# Patient Record
Sex: FEMALE | Race: WHITE | Employment: UNEMPLOYED | URBAN - METROPOLITAN AREA
[De-identification: names, ages, dates, MRNs, and addresses within clinical notes are randomized per-mention and may not be internally consistent; named-entity substitution may affect disease eponyms.]

---

## 2022-01-11 ENCOUNTER — TRANSFERRED RECORDS (OUTPATIENT)
Dept: HEALTH INFORMATION MANAGEMENT | Facility: CLINIC | Age: 22
End: 2022-01-11
Payer: COMMERCIAL

## 2022-02-15 ENCOUNTER — HOSPITAL ENCOUNTER (OUTPATIENT)
Dept: CARDIOLOGY | Facility: CLINIC | Age: 22
Discharge: HOME | End: 2022-02-15
Attending: INTERNAL MEDICINE | Admitting: INTERNAL MEDICINE
Payer: COMMERCIAL

## 2022-02-15 ENCOUNTER — OFFICE VISIT (OUTPATIENT)
Dept: CARDIOLOGY | Facility: CLINIC | Age: 22
End: 2022-02-15
Payer: COMMERCIAL

## 2022-02-15 ENCOUNTER — LAB (OUTPATIENT)
Dept: LAB | Facility: CLINIC | Age: 22
End: 2022-02-15
Payer: COMMERCIAL

## 2022-02-15 VITALS
DIASTOLIC BLOOD PRESSURE: 105 MMHG | OXYGEN SATURATION: 97 % | BODY MASS INDEX: 42.3 KG/M2 | HEIGHT: 67 IN | HEART RATE: 120 BPM | SYSTOLIC BLOOD PRESSURE: 137 MMHG | WEIGHT: 269.5 LBS

## 2022-02-15 DIAGNOSIS — Z83.438 FAMILY HISTORY OF DYSLIPIDEMIA: ICD-10-CM

## 2022-02-15 DIAGNOSIS — Z82.49 FAMILY HISTORY OF ISCHEMIC HEART DISEASE: ICD-10-CM

## 2022-02-15 DIAGNOSIS — E78.5 DYSLIPIDEMIA: ICD-10-CM

## 2022-02-15 DIAGNOSIS — E66.01 MORBID OBESITY (H): ICD-10-CM

## 2022-02-15 DIAGNOSIS — I10 BENIGN ESSENTIAL HYPERTENSION: ICD-10-CM

## 2022-02-15 DIAGNOSIS — Z82.49 FAMILY HISTORY OF ISCHEMIC HEART DISEASE: Primary | ICD-10-CM

## 2022-02-15 DIAGNOSIS — F41.9 ANXIETY: ICD-10-CM

## 2022-02-15 DIAGNOSIS — J45.990 EXERCISE-INDUCED ASTHMA: ICD-10-CM

## 2022-02-15 LAB
ALBUMIN SERPL-MCNC: 4.2 G/DL (ref 3.4–5)
ALP SERPL-CCNC: 85 U/L (ref 40–150)
ALT SERPL W P-5'-P-CCNC: 104 U/L (ref 0–50)
ANION GAP SERPL CALCULATED.3IONS-SCNC: 9 MMOL/L (ref 3–14)
AST SERPL W P-5'-P-CCNC: 56 U/L (ref 0–45)
BILIRUB SERPL-MCNC: 0.5 MG/DL (ref 0.2–1.3)
BUN SERPL-MCNC: 9 MG/DL (ref 7–30)
CALCIUM SERPL-MCNC: 9.6 MG/DL (ref 8.5–10.1)
CHLORIDE BLD-SCNC: 104 MMOL/L (ref 94–109)
CHOLEST SERPL-MCNC: 265 MG/DL
CO2 SERPL-SCNC: 22 MMOL/L (ref 20–32)
CREAT SERPL-MCNC: 0.61 MG/DL (ref 0.52–1.04)
ERYTHROCYTE [DISTWIDTH] IN BLOOD BY AUTOMATED COUNT: 11.7 % (ref 10–15)
FASTING STATUS PATIENT QL REPORTED: YES
GFR SERPL CREATININE-BSD FRML MDRD: >90 ML/MIN/1.73M2
GLUCOSE BLD-MCNC: 118 MG/DL (ref 70–99)
HBA1C MFR BLD: 6.1 % (ref 0–5.6)
HCT VFR BLD AUTO: 45.9 % (ref 35–47)
HDLC SERPL-MCNC: 35 MG/DL
HGB BLD-MCNC: 15.4 G/DL (ref 11.7–15.7)
LDLC SERPL CALC-MCNC: 161 MG/DL
LDLC SERPL CALC-MCNC: 189 MG/DL
LVEF ECHO: NORMAL
MCH RBC QN AUTO: 29.2 PG (ref 26.5–33)
MCHC RBC AUTO-ENTMCNC: 33.6 G/DL (ref 31.5–36.5)
MCV RBC AUTO: 87 FL (ref 78–100)
NONHDLC SERPL-MCNC: 230 MG/DL
PLATELET # BLD AUTO: 362 10E3/UL (ref 150–450)
POTASSIUM BLD-SCNC: 4 MMOL/L (ref 3.4–5.3)
PROT SERPL-MCNC: 8.2 G/DL (ref 6.8–8.8)
RBC # BLD AUTO: 5.28 10E6/UL (ref 3.8–5.2)
SODIUM SERPL-SCNC: 135 MMOL/L (ref 133–144)
TRIGL SERPL-MCNC: 343 MG/DL
TSH SERPL DL<=0.005 MIU/L-ACNC: 2.61 MU/L (ref 0.4–4)
WBC # BLD AUTO: 9.5 10E3/UL (ref 4–11)

## 2022-02-15 PROCEDURE — 999N000208 ECHOCARDIOGRAM COMPLETE

## 2022-02-15 PROCEDURE — 84443 ASSAY THYROID STIM HORMONE: CPT | Performed by: INTERNAL MEDICINE

## 2022-02-15 PROCEDURE — 83721 ASSAY OF BLOOD LIPOPROTEIN: CPT | Performed by: INTERNAL MEDICINE

## 2022-02-15 PROCEDURE — 99205 OFFICE O/P NEW HI 60 MIN: CPT | Mod: 25 | Performed by: INTERNAL MEDICINE

## 2022-02-15 PROCEDURE — 85027 COMPLETE CBC AUTOMATED: CPT | Performed by: INTERNAL MEDICINE

## 2022-02-15 PROCEDURE — 83695 ASSAY OF LIPOPROTEIN(A): CPT | Mod: 90 | Performed by: INTERNAL MEDICINE

## 2022-02-15 PROCEDURE — 93000 ELECTROCARDIOGRAM COMPLETE: CPT | Performed by: INTERNAL MEDICINE

## 2022-02-15 PROCEDURE — 36415 COLL VENOUS BLD VENIPUNCTURE: CPT | Performed by: INTERNAL MEDICINE

## 2022-02-15 PROCEDURE — 93306 TTE W/DOPPLER COMPLETE: CPT | Mod: 26 | Performed by: INTERNAL MEDICINE

## 2022-02-15 PROCEDURE — 80053 COMPREHEN METABOLIC PANEL: CPT | Performed by: INTERNAL MEDICINE

## 2022-02-15 PROCEDURE — 80061 LIPID PANEL: CPT | Performed by: INTERNAL MEDICINE

## 2022-02-15 PROCEDURE — 255N000002 HC RX 255 OP 636: Performed by: INTERNAL MEDICINE

## 2022-02-15 PROCEDURE — 99000 SPECIMEN HANDLING OFFICE-LAB: CPT | Performed by: INTERNAL MEDICINE

## 2022-02-15 PROCEDURE — 83036 HEMOGLOBIN GLYCOSYLATED A1C: CPT | Performed by: INTERNAL MEDICINE

## 2022-02-15 RX ORDER — NORGESTIMATE AND ETHINYL ESTRADIOL 0.25-0.035
1 KIT ORAL DAILY
COMMUNITY

## 2022-02-15 RX ORDER — ALBUTEROL SULFATE 90 UG/1
2 AEROSOL, METERED RESPIRATORY (INHALATION) EVERY 6 HOURS PRN
COMMUNITY

## 2022-02-15 RX ADMIN — HUMAN ALBUMIN MICROSPHERES AND PERFLUTREN 9 ML: 10; .22 INJECTION, SOLUTION INTRAVENOUS at 14:34

## 2022-02-15 ASSESSMENT — MIFFLIN-ST. JEOR: SCORE: 2010.07

## 2022-02-15 NOTE — LETTER
2/15/2022    Provider Not In System       RE: Laureen Kohler       Dear Colleague,     I had the pleasure of seeing Laureen Kohler in the [facility].      Cardiology Clinic Consultation:    Preventive Cardiology Visit    February 15, 2022   Patient Name: Laureen Kohler  Patient MRN: [id number]    Consult indication: FH of ASCVD      HPI:    I had the opportunity to see patient Laureen Kohler in cardiology clinic for a consultation. Patient is followed by our colleague with OB/gyn.    As you know, patient is a pleasant 21-year-old female with a past medical history significant for obesity, exercise-induced asthma, family history of early ASCVD, dyslipidemia in childhood, who presents for cardiovascular disease risk assessment and modification.    Patient reports that when she was approximately 8 years old, she was found to have elevated cholesterol levels.  Her father passed away from an MI at age 50, and her uncle is currently hospitalized on the [facility] with heart related issues, and patient notes that he had an abnormal Lp(a) level.     Patient recently moved from the [facility] to the [place].  She works as a dietitian.  At baseline, she is not as physically active as she would like, previously exercised regularly, though had issues with anxiety and notes that her relationship with gym/diet was unhealthy, though she has since moved past this.  She denies any chest pain, chest pressure, abnormal shortness of breath.  No recent change in exertional capacity.  She uses an inhaler as needed, also is on oral contraceptives for birth control.    Blood pressure is elevated today at 137/105 mmHg, heart rate 110 bpm in normal sinus.  She does endorse that she is likely somewhat anxious.  She reports that her blood pressures are slightly elevated typically, though generally in the 120/70-80 mmHg range at home.  Heart rates are generally in the 70s beats per minute range at home.      She  "does not drink alcohol regularly, does not smoke cigarettes, denies recreational drug use.    She does snore, however denies excessive daytime somnolence, nonrestorative sleep.  She had a tonsillectomy in early childhood.    ECG demonstrates sinus tachycardia at approximately 117 bpm, normal axis, normal intervals, no acute ischemic changes.    Assessment and Plan/Recommendations:    # FH of early ASCVD  # Dyslipidemia in childhood, prior records not available for review  # HTN, BP elevated in clinic though lower at home, consistent with \"White coat hypertension\"  # Anxiety  # Sinus tachycardia, likely 2/2 anxiety, also was running late for the appointment after some difficulty finding our clinic location (arrived an hour early)  # Exercise-induced asthma  # Obesity    -Fasting lipids, Lp(a) level  -Patient is planning to become pregnant in the future, discussed that generally would want to avoid initiation of nonessential medications as able, and so would favor lifestyle modification of dyslipidemia and hypertension as first-line management, patient is in agreement.  She is on oral contraceptives which can result in dyslipidemia, depending on labs, may recommend she revisit alternative options with her OB/Gyn physician  -Hemoglobin A1c, CBC, CMP  -Holter monitor  -24-hour ambulatory BP monitor  -TTE  -Advised to continue regular exercise, goal for weight loss, and to continue a heart healthy diet (Mediterranean diet)   -Follow-up in 6 months with fasting lipids at that time, or sooner as needed    Thank you for allowing our team to participate in the care of Laureen Kohler.  Please do not hesitate to call or page me with any questions or concerns.    Sincerely,     Prem Rodríguez MD, [facility]  Cardiology  February 15, 2022    Voice recognition software utilized.     Total time spent on this encounter: 65 minutes, providing care in this encounter including, but not limited to, reviewing prior medical " "records, laboratory data, imaging studies, diagnostic studies, procedure notes, formulating an assessment and plan, recommendations, discussion and counseling with patient face to face, dictation.    Past Medical History:     Patient Active Problem List   Diagnosis     Morbid obesity (H)     Dyslipidemia     Benign essential hypertension     Family history of dyslipidemia     Family history of ischemic heart disease     Exercise-induced asthma       Past Surgical History:   History reviewed. No pertinent surgical history.    Medications (outpatient):  Current Outpatient Medications   Medication Sig Dispense Refill     albuterol (PROAIR HFA/PROVENTIL HFA/VENTOLIN HFA) 108 (90 Base) MCG/ACT inhaler Inhale 2 puffs into the lungs every 6 hours as needed for shortness of breath / dyspnea or wheezing       Aspirin 81 MG CAPS Take by mouth daily       norgestimate-ethinyl estradiol (ORTHO-CYCLEN) 0.25-35 MG-MCG tablet Take 1 tablet by mouth daily         Allergies:  Allergies   Allergen Reactions     Sulfa Drugs      rash       Social History:   History   Drug Use Not on file      History   Smoking Status     Never Smoker   Smokeless Tobacco     Never Used     Social History    Substance and Sexual Activity      Alcohol use: Yes        Comment: 2 drinks month       Family History:  Family History   Problem Relation Age of Onset     Myocardial Infarction Father      Myocardial Infarction Maternal Grandfather      Myocardial Infarction Paternal Grandmother      Myocardial Infarction Paternal Grandfather      Myocardial Infarction Paternal Uncle        Review of Systems:   A complete review of systems was negative except as mentioned in the History of Present Illness.     Objective & Physical Exam:  BP (!) 137/105   Pulse 120   Ht 1.702 m (5' 7\")   Wt 122.2 kg (269 lb 8 oz)   SpO2 97%   BMI 42.21 kg/m    Wt Readings from Last 2 Encounters:   02/15/22 122.2 kg (269 lb 8 oz)     Body mass index is 42.21 kg/m .   Body " surface area is 2.4 meters squared.    Constitutional: appears stated age, in no apparent distress, appears to be well nourished  Head: normocephalic, atraumatic  Neck: supple, trachea midline, no bruit bilaterally   Pulmonary: clear to auscultation bilaterally, no wheezes, no rales, no increased work of breathing  Cardiovascular: JVP normal, tachycardic, regular rhythm, normal S1 and S2, no S3, S4, no murmur appreciated, no lower extremity edema  Gastrointestinal: no guarding, non-rigid   Neurologic: awake, alert, moves all extremities  Skin: no jaundice, warm on limited exam  Psychiatric: affect is normal, answers questions appropriately, oriented to self and place          Thank you for allowing me to participate in the care of your patient.      Sincerely,     Prem Rodríguez MD     [facility]  cc:   No referring provider defined for this encounter.

## 2022-02-15 NOTE — LETTER
Date:March 10, 2022      Patient was self referred, no letter generated. Do not send.        [facility] Health Information

## 2022-02-15 NOTE — PATIENT INSTRUCTIONS
February 15, 2022    Thank you for allowing our Cardiology team to participate in your care.     Please note the following changes to your heart treatment plan:     Medication changes:   - none    Tests to be done:  - transthoracic echocardiogram (heart ultrasound)  - 24h BP monitor  - Holter monitor   - FASTING labs today  - FASTING labs in 6 months    Follow up:  - Follow up in 6 months, or sooner as needed.      Please contact our team at [phone] or [phone] for any questions or concerns.   For scheduling, please call [phone].  If you are having a medical emergency, please call [phone].     Sincerely,    Prem Rodríguez MD, FACC  Cardiology    [facility] [place]

## 2022-02-15 NOTE — PROGRESS NOTES
Cardiology Clinic Consultation:    Preventive Cardiology Visit    February 15, 2022   Patient Name: Laureen Kohler  Patient MRN: [id number]    Consult indication: FH of ASCVD      HPI:    I had the opportunity to see patient Laureen Kohler in cardiology clinic for a consultation. Patient is followed by our colleague with OB/gyn.    As you know, patient is a pleasant 21-year-old female with a past medical history significant for obesity, exercise-induced asthma, family history of early ASCVD, dyslipidemia in childhood, who presents for cardiovascular disease risk assessment and modification.    Patient reports that when she was approximately 8 years old, she was found to have elevated cholesterol levels.  Her father passed away from an MI at age 50, and her uncle is currently hospitalized on the [facility] with heart related issues, and patient notes that he had an abnormal Lp(a) level.     Patient recently moved from the [facility] to the [place].  She works as a dietitian.  At baseline, she is not as physically active as she would like, previously exercised regularly, though had issues with anxiety and notes that her relationship with gym/diet was unhealthy, though she has since moved past this.  She denies any chest pain, chest pressure, abnormal shortness of breath.  No recent change in exertional capacity.  She uses an inhaler as needed, also is on oral contraceptives for birth control.    Blood pressure is elevated today at 137/105 mmHg, heart rate 110 bpm in normal sinus.  She does endorse that she is likely somewhat anxious.  She reports that her blood pressures are slightly elevated typically, though generally in the 120/70-80 mmHg range at home.  Heart rates are generally in the 70s beats per minute range at home.      She does not drink alcohol regularly, does not smoke cigarettes, denies recreational drug use.    She does snore, however denies excessive daytime somnolence, nonrestorative  "sleep.  She had a tonsillectomy in early childhood.    ECG demonstrates sinus tachycardia at approximately 117 bpm, normal axis, normal intervals, no acute ischemic changes.    Assessment and Plan/Recommendations:    # FH of early ASCVD  # Dyslipidemia in childhood, prior records not available for review  # HTN, BP elevated in clinic though lower at home, consistent with \"White coat hypertension\"  # Anxiety  # Sinus tachycardia, likely 2/2 anxiety, also was running late for the appointment after some difficulty finding our clinic location (arrived an hour early)  # Exercise-induced asthma  # Obesity    -Fasting lipids, Lp(a) level  -Patient is planning to become pregnant in the future, discussed that generally would want to avoid initiation of nonessential medications as able, and so would favor lifestyle modification of dyslipidemia and hypertension as first-line management, patient is in agreement.  She is on oral contraceptives which can result in dyslipidemia, depending on labs, may recommend she revisit alternative options with her OB/Gyn physician  -Hemoglobin A1c, CBC, CMP  -Holter monitor  -24-hour ambulatory BP monitor  -TTE  -Advised to continue regular exercise, goal for weight loss, and to continue a heart healthy diet (Mediterranean diet)   -Follow-up in 6 months with fasting lipids at that time, or sooner as needed    Thank you for allowing our team to participate in the care of Laureen Kohler.  Please do not hesitate to call or page me with any questions or concerns.    Sincerely,     Prem Rodríguez MD, [facility]  Cardiology  February 15, 2022    Voice recognition software utilized.     Total time spent on this encounter: 65 minutes, providing care in this encounter including, but not limited to, reviewing prior medical records, laboratory data, imaging studies, diagnostic studies, procedure notes, formulating an assessment and plan, recommendations, discussion and counseling with patient " "face to face, dictation.    Past Medical History:     Patient Active Problem List   Diagnosis     Morbid obesity (H)     Dyslipidemia     Benign essential hypertension     Family history of dyslipidemia     Family history of ischemic heart disease     Exercise-induced asthma       Past Surgical History:   History reviewed. No pertinent surgical history.    Medications (outpatient):  Current Outpatient Medications   Medication Sig Dispense Refill     albuterol (PROAIR HFA/PROVENTIL HFA/VENTOLIN HFA) 108 (90 Base) MCG/ACT inhaler Inhale 2 puffs into the lungs every 6 hours as needed for shortness of breath / dyspnea or wheezing       Aspirin 81 MG CAPS Take by mouth daily       norgestimate-ethinyl estradiol (ORTHO-CYCLEN) 0.25-35 MG-MCG tablet Take 1 tablet by mouth daily         Allergies:  Allergies   Allergen Reactions     Sulfa Drugs      rash       Social History:   History   Drug Use Not on file      History   Smoking Status     Never Smoker   Smokeless Tobacco     Never Used     Social History    Substance and Sexual Activity      Alcohol use: Yes        Comment: 2 drinks month       Family History:  Family History   Problem Relation Age of Onset     Myocardial Infarction Father      Myocardial Infarction Maternal Grandfather      Myocardial Infarction Paternal Grandmother      Myocardial Infarction Paternal Grandfather      Myocardial Infarction Paternal Uncle        Review of Systems:   A complete review of systems was negative except as mentioned in the History of Present Illness.     Objective & Physical Exam:  BP (!) 137/105   Pulse 120   Ht 1.702 m (5' 7\")   Wt 122.2 kg (269 lb 8 oz)   SpO2 97%   BMI 42.21 kg/m    Wt Readings from Last 2 Encounters:   02/15/22 122.2 kg (269 lb 8 oz)     Body mass index is 42.21 kg/m .   Body surface area is 2.4 meters squared.    Constitutional: appears stated age, in no apparent distress, appears to be well nourished  Head: normocephalic, atraumatic  Neck: supple, " trachea midline, no bruit bilaterally   Pulmonary: clear to auscultation bilaterally, no wheezes, no rales, no increased work of breathing  Cardiovascular: JVP normal, tachycardic, regular rhythm, normal S1 and S2, no S3, S4, no murmur appreciated, no lower extremity edema  Gastrointestinal: no guarding, non-rigid   Neurologic: awake, alert, moves all extremities  Skin: no jaundice, warm on limited exam  Psychiatric: affect is normal, answers questions appropriately, oriented to self and place

## 2022-02-17 LAB — LPA SERPL-MCNC: 40 MG/DL

## 2022-02-18 ENCOUNTER — TELEPHONE (OUTPATIENT)
Dept: CARDIOLOGY | Facility: CLINIC | Age: 22
End: 2022-02-18
Payer: COMMERCIAL

## 2022-02-18 NOTE — TELEPHONE ENCOUNTER
Call placed to pt to review results and recommendations:     Interpretation Summary     The rhythm was sinus tachycardia of 130 BPM.  Technically difficult study.  Left ventricular systolic function is normal.  The visual ejection fraction is 65-70%.  No regional wall motion abnormalities noted.  The right ventricle is normal in size and function.  Cardiac valves not well visualized. No significant valvular disease based on  Doppler data.  Trivial pericardial effusion (likely normal pericardial fluid).     There is no comparison study available.        Component      Latest Ref Rng & Units 2/15/2022   Sodium      133 - 144 mmol/L 135   Potassium      3.4 - 5.3 mmol/L 4.0   Chloride      94 - 109 mmol/L 104   Carbon Dioxide      20 - 32 mmol/L 22   Anion Gap      3 - 14 mmol/L 9   Urea Nitrogen      7 - 30 mg/dL 9   Creatinine      0.52 - 1.04 mg/dL 0.61   Calcium      8.5 - 10.1 mg/dL 9.6   Glucose      70 - 99 mg/dL 118 (H)   Alkaline Phosphatase      40 - 150 U/L 85   AST      0 - 45 U/L 56 (H)   ALT      0 - 50 U/L 104 (H)   Protein Total      6.8 - 8.8 g/dL 8.2   Albumin      3.4 - 5.0 g/dL 4.2   Bilirubin Total      0.2 - 1.3 mg/dL 0.5   GFR Estimate      >60 mL/min/1.73m2 >90   WBC      4.0 - 11.0 10e3/uL 9.5   RBC Count      3.80 - 5.20 10e6/uL 5.28 (H)   Hemoglobin      11.7 - 15.7 g/dL 15.4   Hematocrit      35.0 - 47.0 % 45.9   MCV      78 - 100 fL 87   MCH      26.5 - 33.0 pg 29.2   MCHC      31.5 - 36.5 g/dL 33.6   RDW      10.0 - 15.0 % 11.7   Platelet Count      150 - 450 10e3/uL 362   Cholesterol      <200 mg/dL 265 (H)   Triglycerides      <150 mg/dL 343 (H)   HDL Cholesterol      >=50 mg/dL 35 (L)   LDL Cholesterol Calculated      <=100 mg/dL 161 (H)   Non HDL Cholesterol      <130 mg/dL 230 (H)   Patient Fasting > 8hrs?       Yes   LDL Cholesterol Direct      <100 mg/dL 189 (H)   Lipoprotein (a)      <=29 mg/dL 40 (H)   TSH      0.40 - 4.00 mU/L 2.61   Hemoglobin A1C      0.0 - 5.6 % 6.1 (H)      ----- Message from Prem Rodríguez MD sent at 2/15/2022  4:25 PM CST -----  Results reviewed, please let the patient know that overall findings are reassuring, normal cardiac structure and function. Her heart rate was elevated during the study, which could be related to some degree of nervousness or discomfort from the study, will need evaluation with a Holter (already ordered). Follow up as previously planned, thanks!        Call placed to pt to review results and recommendations. Pt verbalized understanding. Pt requested hard copy be mailed to her for personal records. Which has been completed. No further questions or concerns.   AMARIS Dwyer RN, BSN. 02/22/22 3:43 PM

## 2022-02-22 ENCOUNTER — HOSPITAL ENCOUNTER (OUTPATIENT)
Dept: CARDIOLOGY | Facility: CLINIC | Age: 22
Discharge: HOME | End: 2022-02-22
Attending: INTERNAL MEDICINE | Admitting: INTERNAL MEDICINE
Payer: COMMERCIAL

## 2022-02-22 DIAGNOSIS — I10 BENIGN ESSENTIAL HYPERTENSION: ICD-10-CM

## 2022-02-22 PROCEDURE — 93790 AMBL BP MNTR W/SW I&R: CPT | Performed by: INTERNAL MEDICINE

## 2022-02-22 PROCEDURE — 93786 AMBL BP MNTR W/SW REC ONLY: CPT

## 2022-02-23 ENCOUNTER — HOSPITAL ENCOUNTER (OUTPATIENT)
Dept: CARDIOLOGY | Facility: CLINIC | Age: 22
Discharge: HOME | End: 2022-02-23
Attending: INTERNAL MEDICINE | Admitting: INTERNAL MEDICINE
Payer: COMMERCIAL

## 2022-02-23 DIAGNOSIS — I10 BENIGN ESSENTIAL HYPERTENSION: ICD-10-CM

## 2022-02-23 PROCEDURE — 93225 XTRNL ECG REC<48 HRS REC: CPT

## 2022-02-23 PROCEDURE — 93227 XTRNL ECG REC<48 HR R&I: CPT | Performed by: INTERNAL MEDICINE

## 2022-03-03 ENCOUNTER — TELEPHONE (OUTPATIENT)
Dept: CARDIOLOGY | Facility: CLINIC | Age: 22
End: 2022-03-03
Payer: COMMERCIAL

## 2022-03-03 DIAGNOSIS — I10 BENIGN ESSENTIAL HYPERTENSION: Primary | ICD-10-CM

## 2022-03-03 NOTE — TELEPHONE ENCOUNTER
[facility] Call Center    Phone Message    May a detailed message be left on voicemail: yes     Reason for Call: Order(s): Other:   Reason for requested: Dr. Rodríguez wants pt to try a new Blood pressure med but can't prescribe it until blood work is done to ensure pt is not pregnant.  Pt wants to have the blood work done, but cant' until there is an order placed for this test.  Date needed: ASAP  Provider name: Dr. Prem Rodríguez      Action Taken: Message routed to:  [facility] cardio    Travel Screening: Not Applicable

## 2022-03-04 ENCOUNTER — LAB (OUTPATIENT)
Dept: LAB | Facility: CLINIC | Age: 22
End: 2022-03-04
Payer: COMMERCIAL

## 2022-03-04 ENCOUNTER — TELEPHONE (OUTPATIENT)
Dept: CARDIOLOGY | Facility: CLINIC | Age: 22
End: 2022-03-04

## 2022-03-04 DIAGNOSIS — I10 BENIGN ESSENTIAL HYPERTENSION: Primary | ICD-10-CM

## 2022-03-04 DIAGNOSIS — I10 BENIGN ESSENTIAL HYPERTENSION: ICD-10-CM

## 2022-03-04 DIAGNOSIS — R00.0 TACHYCARDIA: ICD-10-CM

## 2022-03-04 LAB — HCG SERPL QL: NEGATIVE

## 2022-03-04 PROCEDURE — 84703 CHORIONIC GONADOTROPIN ASSAY: CPT

## 2022-03-04 PROCEDURE — 36415 COLL VENOUS BLD VENIPUNCTURE: CPT

## 2022-03-04 RX ORDER — LABETALOL 200 MG/1
200 TABLET, FILM COATED ORAL 2 TIMES DAILY
Qty: 180 TABLET | Refills: 1 | Status: SHIPPED | OUTPATIENT
Start: 2022-03-04 | End: 2022-05-16

## 2022-03-04 NOTE — TELEPHONE ENCOUNTER
----- Message from Prem Rodríguez MD sent at 3/4/2022  1:23 PM CST -----  Results reviewed, recommendations remain the same as prior:    Results reviewed, 24h BP monitor found borderline avg elevated BPs, but persistent mild elevated HR (sinus tachycardia), which was confirmed on the Holter monitor. Recommend continued efforts at staying hydrated, 2-3L/day fluids, thyroid function was normal, even though she's on birth control, we should exclude physiologic causes of sinus tachycardia.      Please have her come in for a serum HCG test at her convenience.      If that comes back negative, we will plan to start labetalol 200mg BID, since this should help with our diagnosis of Inappropriate Sinus Tachycardia, and also help with her mild HTN, and has been the most studied beta-blocker in pregnancy since she did mention she was planning on becoming pregnant in the future, thanks!

## 2022-03-04 NOTE — RESULT ENCOUNTER NOTE
Results reviewed, recommendations remain the same as prior:    Results reviewed, 24h BP monitor found borderline avg elevated BPs, but persistent mild elevated HR (sinus tachycardia), which was confirmed on the Holter monitor. Recommend continued efforts at staying hydrated, 2-3L/day fluids, thyroid function was normal, even though she's on birth control, we should exclude physiologic causes of sinus tachycardia.      Please have her come in for a serum HCG test at her convenience.      If that comes back negative, we will plan to start labetalol 200mg BID, since this should help with our diagnosis of Inappropriate Sinus Tachycardia, and also help with her mild HTN, and has been the most studied beta-blocker in pregnancy since she did mention she was planning on becoming pregnant in the future, thanks!

## 2022-03-04 NOTE — TELEPHONE ENCOUNTER
Call placed to pt to review recommendation per Dr. Rodríguez based on recent findings. Pt has some hesitancy initiating medication therapy and concern for side effects. Reviewed medication therapy pro's and con's as well as encouragement for lifestyle modification. Plan for pt to start medication in conjunction with lifestyle modification. Pt will call if side effects. Reviewed with pt that with lifestyle modification this may not be a long term therapy. Pt verbalized understanding and will plan to initiate medication. Will follow up in a couple weeks if any concerns. Orders placed per MD. AMARIS Dwyer RN, BSN. 03/04/22 2:10 PM

## 2022-03-04 NOTE — TELEPHONE ENCOUNTER
----- Message from Prem Rodríguez MD sent at 3/4/2022  3:24 PM CST -----  Abhilash Duckworth, I spoke to the Pt on the phone just now to go over the recommendation for starting labetalol, could we please have her get a ZioPatch (7 days) in about 3 weeks and see me in 2 months? I told her she'll probably get a call to schedule this on Monday. Thanks!      ++ Orders placed per MD - message to scheduling to reach out and arrange.     AMARIS Dwyer RN, BSN. 03/04/22 3:35 PM

## 2022-03-21 ENCOUNTER — HOSPITAL ENCOUNTER (OUTPATIENT)
Dept: CARDIOLOGY | Facility: CLINIC | Age: 22
Discharge: HOME | End: 2022-03-21
Attending: INTERNAL MEDICINE | Admitting: INTERNAL MEDICINE
Payer: COMMERCIAL

## 2022-03-21 DIAGNOSIS — I10 BENIGN ESSENTIAL HYPERTENSION: ICD-10-CM

## 2022-03-21 DIAGNOSIS — R00.0 TACHYCARDIA: ICD-10-CM

## 2022-03-21 PROCEDURE — 93248 EXT ECG>7D<15D REV&INTERPJ: CPT | Performed by: INTERNAL MEDICINE

## 2022-03-21 PROCEDURE — 93242 EXT ECG>48HR<7D RECORDING: CPT

## 2022-04-03 ENCOUNTER — HEALTH MAINTENANCE LETTER (OUTPATIENT)
Age: 22
End: 2022-04-03

## 2022-04-15 NOTE — RESULT ENCOUNTER NOTE
Results reviewed, please let the patient know that overall findings are favorable, average HR is now within normal range, no significant bradycardia or pauses. Follow up as previously planned, thanks!

## 2022-05-16 ENCOUNTER — OFFICE VISIT (OUTPATIENT)
Dept: CARDIOLOGY | Facility: CLINIC | Age: 22
End: 2022-05-16
Attending: INTERNAL MEDICINE
Payer: COMMERCIAL

## 2022-05-16 VITALS
DIASTOLIC BLOOD PRESSURE: 88 MMHG | HEART RATE: 88 BPM | WEIGHT: 268.4 LBS | SYSTOLIC BLOOD PRESSURE: 132 MMHG | HEIGHT: 67 IN | BODY MASS INDEX: 42.12 KG/M2

## 2022-05-16 DIAGNOSIS — Z30.018 ENCOUNTER FOR INITIAL PRESCRIPTION OF OTHER CONTRACEPTIVES: Primary | ICD-10-CM

## 2022-05-16 DIAGNOSIS — R00.0 TACHYCARDIA: ICD-10-CM

## 2022-05-16 DIAGNOSIS — I15.9 SECONDARY HYPERTENSION: ICD-10-CM

## 2022-05-16 DIAGNOSIS — I10 BENIGN ESSENTIAL HYPERTENSION: ICD-10-CM

## 2022-05-16 PROCEDURE — 93000 ELECTROCARDIOGRAM COMPLETE: CPT | Performed by: INTERNAL MEDICINE

## 2022-05-16 PROCEDURE — 99214 OFFICE O/P EST MOD 30 MIN: CPT | Performed by: INTERNAL MEDICINE

## 2022-05-16 RX ORDER — LABETALOL 200 MG/1
200 TABLET, FILM COATED ORAL 2 TIMES DAILY
Qty: 240 TABLET | Refills: 0 | Status: SHIPPED | OUTPATIENT
Start: 2022-05-16 | End: 2022-08-29

## 2022-05-16 NOTE — PATIENT INSTRUCTIONS
May 16, 2022    Thank you for allowing our Cardiology team to participate in your care.     Please note the following changes to your heart treatment plan:     Medication changes:   - none    Tests to be done:  - 24 hour urine cortisol test  - renal artery ultrasounds  - plasma metanephrines     Follow up:  - Follow up with OB/GYN  - Follow up with cardiology THOMAS in about 3 months, or sooner as needed.      Please contact our team at [phone] or [phone] for any questions or concerns.   For scheduling, please call [phone].  If you are having a medical emergency, please call [phone].     Sincerely,    Prem Rodríguez MD, [facility]  Cardiology    [facility] [place]

## 2022-05-16 NOTE — LETTER
5/16/2022    Provider Not In System       RE: Laureen Kohler       Dear Colleague,     I had the pleasure of seeing Laureen Kohler in the [facility].      Cardiology Clinic Progress Note:    May 16, 2022   Patient Name: Laureen Kohler  Patient MRN: [id number]     Consult indication: HTN    HPI:    I had the opportunity to see patient Laureen Kohler in cardiology clinic for a follow up visit.     As you know, patient is a pleasant 21-year-old female with a past medical history significant for obesity, exercise-induced asthma, family history of early ASCVD, dyslipidemia in childhood, who presents for follow up.    I had initially met patient in clinic for a consultation on 2/15/2022 regarding family history of early ASCVD.  At that time, she was noted to be mildly tachycardic, and also reported a longstanding issue with hypertension.     Patient reports that when she was approximately 8 years old, she was found to have elevated cholesterol levels.  Her father passed away from an MI at age 50, and her uncle is currently hospitalized on the [facility] with heart related issues, and patient notes that he had an abnormal Lp(a) level.      Patient recently moved from the [facility] to the [place].  She works as a dietitian.  At baseline, she is not as physically active as she would like, previously exercised regularly, though had issues with anxiety and notes that her relationship with gym/diet was unhealthy, though she has since moved past this.  She denies any chest pain, chest pressure, abnormal shortness of breath.  No recent change in exertional capacity.  She uses an inhaler as needed, also is on oral contraceptives for birth control.    Patient denies excessive daytime somnolence, nonrestorative sleep, she states that her partner does not report that she snores excessively.  She did have a tonsillectomy in childhood.  For further evaluation, labs were obtained notable for TSH normal,  hCG qualitative negative, , AST 56, total cholesterol 265.  Hemoglobin A1c 6.1,  (direct), LP(a) 40, triglycerides 343.  Potassium 4, sodium 135, creatinine 0.61.      Patient underwent a 24-hour blood pressure monitor 2/22/2022 that demonstrated average blood pressures generally 125/80 1 mmHg, average heart rate 100 bpm.  A Holter monitor was done to assess average heart rate, average heart rate was 106 bpm.  Holter monitor reported a possible 15-minute burden of atrial fibrillation, however waveforms of this were not captured, it did demonstrate some sinus arrhythmia, likely artifact.  Patient was started on labetalol 200 mg twice daily follow-up Holter monitor 3/21/2022 to 3/29/2022 demonstrated sinus rhythm with an average heart rate of 87 bpm, rare PACs and rare PVCs.  No atrial fibrillation.     She does not drink alcohol regularly, does not smoke cigarettes, denies recreational drug use.     She does snore, however denies excessive daytime somnolence, nonrestorative sleep.  She had a tonsillectomy in early childhood.     ECG demonstrates normal sinus rhythm.    Assessment and Plan/Recommendations:    # HTN, sinus tachycardia.  Hypertension is borderline, however she does have elevated resting heart rates, which has been managed with low-dose labetalol.  Suspect hypertension may be related to oral contraceptive, however will need further investigation for secondary causes.  Denies symptoms concerning for sleep apnea.   # FH of early ASCVD  # Dyslipidemia  # Anxiety  # Exercise-induced asthma  # Obesity  # Elevated ALT/AST, concern for NAFLD, recommended follow up with PCP (results note 2/15/22)  # Elevated HA1c 6.1%, recommended follow up with PCP (results note 2/15/22)    - Will continue labetalol 200 mg twice daily for now however will need investigation for secondary causes  - Renal artery Doppler ultrasounds  - Plasma free metanephrines  - 24-hour urinary free cortisol excretion  - Depending  on results, may need referral to Endocrinology  - Referral to OB/GYN for evaluation/management for alternative contraceptive option that may have less of a risk of causing hypertension  - Follow-up with cardiology THOMAS in 3 months for reassessment.  If feeling generally well at that time, recommend starting fish oil supplementation due to statin contraindication for family planning/contraception    Thank you for allowing our team to participate in the care of Laureen Kohler.  Please do not hesitate to call or page me with any questions or concerns.    Sincerely,     Prem Rodríguez MD, [facility]  Cardiology  Text Page   May 16, 2022    Voice recognition software utilized.     Total time spent on this encounter: 35 minutes, providing care in this encounter including, but not limited to, reviewing prior medical records, laboratory data, imaging studies, diagnostic studies, procedure notes, formulating an assessment and plan, recommendations, discussion and counseling with patient face to face, dictation.    Past Medical History:     Past Medical History:   Diagnosis Date     Anxiety 2/15/2022     Benign essential hypertension 2/15/2022     Dyslipidemia 2/15/2022     Exercise-induced asthma 2/15/2022     Family history of dyslipidemia 2/15/2022     Family history of ischemic heart disease 2/15/2022     Morbid obesity (H) 2/15/2022        Past Surgical History:   History reviewed. No pertinent surgical history.    Medications (outpatient):  Current Outpatient Medications   Medication Sig Dispense Refill     albuterol (PROAIR HFA/PROVENTIL HFA/VENTOLIN HFA) 108 (90 Base) MCG/ACT inhaler Inhale 2 puffs into the lungs every 6 hours as needed for shortness of breath / dyspnea or wheezing       Aspirin 81 MG CAPS Take by mouth daily       labetalol (NORMODYNE) 200 MG tablet Take 1 tablet (200 mg) by mouth 2 times daily 240 tablet 0     norgestimate-ethinyl estradiol (ORTHO-CYCLEN) 0.25-35 MG-MCG tablet Take 1  "tablet by mouth daily         Allergies:  Allergies   Allergen Reactions     Sulfa Drugs      rash       Social History:   History   Drug Use Not on file      History   Smoking Status     Never Smoker   Smokeless Tobacco     Never Used     Social History    Substance and Sexual Activity      Alcohol use: Yes        Comment: 2 drinks month       Family History:  Family History   Problem Relation Age of Onset     Myocardial Infarction Father      Myocardial Infarction Maternal Grandfather      Myocardial Infarction Paternal Grandmother      Myocardial Infarction Paternal Grandfather      Myocardial Infarction Paternal Uncle        Review of Systems:   A complete review of systems was negative except as mentioned in the History of Present Illness.     Objective & Physical Exam:  /88   Pulse 88   Ht 1.702 m (5' 7\")   Wt 121.7 kg (268 lb 6.4 oz)   BMI 42.04 kg/m    Wt Readings from Last 2 Encounters:   05/16/22 121.7 kg (268 lb 6.4 oz)   02/15/22 122.2 kg (269 lb 8 oz)     Body mass index is 42.04 kg/m .   Body surface area is 2.4 meters squared.    Constitutional: appears stated age, in no apparent distress, appears to be well nourished  Head: normocephalic, atraumatic  Neck: supple, trachea midline  Pulmonary: clear to auscultation bilaterally, no wheezes, no rales, no increased work of breathing  Cardiovascular: JVP normal, regular rate, regular rhythm, normal S1 and S2, no S3, S4, no murmur appreciated, no lower extremity edema  Gastrointestinal: no guarding, non-rigid   Neurologic: awake, alert, moves all extremities  Skin: no jaundice, warm on limited exam  Psychiatric: affect is normal, answers questions appropriately, oriented to self and place    Data reviewed:  Lab Results   Component Value Date    WBC 9.5 02/15/2022    RBC 5.28 (H) 02/15/2022    HGB 15.4 02/15/2022    HCT 45.9 02/15/2022    MCV 87 02/15/2022    MCH 29.2 02/15/2022    MCHC 33.6 02/15/2022    RDW 11.7 02/15/2022     02/15/2022 "     Sodium   Date Value Ref Range Status   02/15/2022 135 133 - 144 mmol/L Final     Potassium   Date Value Ref Range Status   02/15/2022 4.0 3.4 - 5.3 mmol/L Final     Chloride   Date Value Ref Range Status   02/15/2022 104 94 - 109 mmol/L Final     Carbon Dioxide (CO2)   Date Value Ref Range Status   02/15/2022 22 20 - 32 mmol/L Final     Anion Gap   Date Value Ref Range Status   02/15/2022 9 3 - 14 mmol/L Final     Glucose   Date Value Ref Range Status   02/15/2022 118 (H) 70 - 99 mg/dL Final     Urea Nitrogen   Date Value Ref Range Status   02/15/2022 9 7 - 30 mg/dL Final     Creatinine   Date Value Ref Range Status   02/15/2022 0.61 0.52 - 1.04 mg/dL Final     GFR Estimate   Date Value Ref Range Status   02/15/2022 >90 >60 mL/min/1.73m2 Final     Comment:     Effective 2021 eGFRcr in adults is calculated using the  CKD-EPI creatinine equation which includes age and gender (Violeta et al., NEJM, DOI: 10.1056/BNFBni3035321)     Calcium   Date Value Ref Range Status   02/15/2022 9.6 8.5 - 10.1 mg/dL Final     Bilirubin Total   Date Value Ref Range Status   02/15/2022 0.5 0.2 - 1.3 mg/dL Final     Alkaline Phosphatase   Date Value Ref Range Status   02/15/2022 85 40 - 150 U/L Final     ALT   Date Value Ref Range Status   02/15/2022 104 (H) 0 - 50 U/L Final     AST   Date Value Ref Range Status   02/15/2022 56 (H) 0 - 45 U/L Final     Recent Labs   Lab Test 02/15/22  0952   CHOL 265*   HDL 35*   *  161*   TRIG 343*      Lab Results   Component Value Date    A1C 6.1 02/15/2022        Recent Results (from the past 4320 hour(s))   Echocardiogram Complete   Result Value    LVEF  65-70%    Narrative    [id number]  [id number]  [id number]  [id number]^CORBY^GEN^GARY     [facility]  [facility]  Echocardiography Laboratory  [address]  [place] [address]  Phone [phone]  Fax [phone]     Name: HI BOGGS  MRN: [id number]  :  2000  Study Date: 02/15/2022 01:50 PM  Age: 21 yrs  Gender: Female  Patient Location: [facility]  Reason For Study: Benign essential hypertension  Ordering Physician: GEN TAVAREZ  Referring Physician: GEN TAVAREZ  Performed By: Paradise Busch     BSA: 2.3 m2  Height: 67 in  Weight: 269 lb  HR: 131  BP: 137/105 mmHg  ______________________________________________________________________________  Procedure  Complete Echo Adult. Optison (NDC #[id number]) given intravenously. Technically  difficult study.     ______________________________________________________________________________  Interpretation Summary     The rhythm was sinus tachycardia of 130 BPM.  Technically difficult study.  Left ventricular systolic function is normal.  The visual ejection fraction is 65-70%.  No regional wall motion abnormalities noted.  The right ventricle is normal in size and function.  Cardiac valves not well visualized. No significant valvular disease based on  Doppler data.  Trivial pericardial effusion (likely normal pericardial fluid).     There is no comparison study available.  ______________________________________________________________________________  Left Ventricle  The left ventricle is normal in size. There is normal left ventricular wall  thickness. Left ventricular systolic function is normal. The visual ejection  fraction is 65-70%. Diastolic function not assessed due to tachycardia. No  regional wall motion abnormalities noted.     Right Ventricle  The right ventricle is normal in size and function.     Atria  Normal left atrial size. Right atrial size is normal.     Mitral Valve  The mitral valve leaflets appear normal. There is no evidence of stenosis,  fluttering, or prolapse. There is trace mitral regurgitation.     Tricuspid Valve  The tricuspid valve is not well visualized. There is trace tricuspid  regurgitation. Right ventricular systolic pressure could not be approximated  due to inadequate tricuspid  regurgitation.     Aortic Valve  The aortic valve is trileaflet. The aortic valve is not well visualized. No  aortic regurgitation is present. No aortic stenosis is present.     Pulmonic Valve  The pulmonic valve is not well visualized.     Vessels  The aortic root is normal size. Normal size ascending aorta. Inferior vena  cava not well visualized for estimation of right atrial pressure.     Pericardium  Trivial pericardial effusion. Likely normal pericardial fluid.     Rhythm  The rhythm was sinus tachycardia.     ______________________________________________________________________________  MMode/2D Measurements & Calculations  IVSd: 1.2 cm  LVIDd: 3.9 cm  LVIDs: 2.3 cm  LVPWd: 1.2 cm  FS: 41.7 %  LV mass(C)d: 150.9 grams  LV mass(C)dI: 65.8 grams/m2  Ao root diam: 2.8 cm  LA dimension: 3.5 cm     asc Aorta Diam: 3.0 cm  LA/Ao: 1.3  LA Volume (BP): 34.4 ml  LA Volume Index (BP): 15.0 ml/m2  RWT: 0.59  TAPSE: 1.7 cm     Doppler Measurements & Calculations  MV E max hardik: 68.4 cm/sec  MV A max hardik: 99.5 cm/sec  MV E/A: 0.69  MV dec time: 0.13 sec     ______________________________________________________________________________  Report approved by: Dr Nupur Caraballo 02/15/2022 03:16 PM                    Thank you for allowing me to participate in the care of your patient.      Sincerely,     Prem Rodríguez MD     [facility]  cc:   Prem Rodríguez MD  [address]  [place] [address]

## 2022-05-16 NOTE — LETTER
Date:May 18, 2022      Provider requested that no letter be sent. Do not send.       [facility]

## 2022-05-31 ENCOUNTER — TELEPHONE (OUTPATIENT)
Dept: CARDIOLOGY | Facility: CLINIC | Age: 22
End: 2022-05-31
Payer: COMMERCIAL

## 2022-05-31 NOTE — TELEPHONE ENCOUNTER
[facility] Call Center    Phone Message    May a detailed message be left on voicemail: yes     Reason for Call: Other: Laureen calling to request a reschedule of her US.  She also needs a lab that same day as well as a urine sample cup mailed to her so she can come to the appointment ready with the urine sample for the lab ordered by Dr. Rodríguez (or an explanation on how that lab would work).  Please call her back to discuss scheduling options.    Action Taken: Message routed to:  Other: Cardiology    Travel Screening: Not Applicable

## 2022-06-02 NOTE — TELEPHONE ENCOUNTER
Call placed to pt to review process of 24 hour urine collection. appointment scheduled with [facility].   AMARIS Dwyer RN, BSN. 06/02/22 2:53 PM

## 2022-06-16 ENCOUNTER — HOSPITAL ENCOUNTER (OUTPATIENT)
Dept: ULTRASOUND IMAGING | Facility: CLINIC | Age: 22
Discharge: HOME | End: 2022-06-16
Attending: INTERNAL MEDICINE
Payer: COMMERCIAL

## 2022-06-16 ENCOUNTER — LAB (OUTPATIENT)
Dept: LAB | Facility: CLINIC | Age: 22
End: 2022-06-16
Attending: INTERNAL MEDICINE
Payer: COMMERCIAL

## 2022-06-16 DIAGNOSIS — I15.9 SECONDARY HYPERTENSION: ICD-10-CM

## 2022-06-16 PROCEDURE — 76770 US EXAM ABDO BACK WALL COMP: CPT | Mod: XS

## 2022-06-16 PROCEDURE — 83835 ASSAY OF METANEPHRINES: CPT

## 2022-06-16 PROCEDURE — 36415 COLL VENOUS BLD VENIPUNCTURE: CPT

## 2022-06-16 PROCEDURE — 93975 VASCULAR STUDY: CPT

## 2022-06-16 PROCEDURE — 76770 US EXAM ABDO BACK WALL COMP: CPT | Mod: 26 | Performed by: INTERNAL MEDICINE

## 2022-06-16 PROCEDURE — 93975 VASCULAR STUDY: CPT | Mod: 26 | Performed by: INTERNAL MEDICINE

## 2022-06-19 LAB
ANNOTATION COMMENT IMP: NORMAL
METANEPHS SERPL-SCNC: <0.1 NMOL/L
NORMETANEPHRINE SERPL-SCNC: 0.17 NMOL/L

## 2022-06-22 NOTE — RESULT ENCOUNTER NOTE
Results reviewed, please let the patient know that overall findings are reassuring, metanephrines are normal, renal ultrasound negative. Follow up as previously planned, thanks!

## 2022-06-26 ENCOUNTER — LAB (OUTPATIENT)
Dept: LAB | Facility: CLINIC | Age: 22
End: 2022-06-26
Payer: COMMERCIAL

## 2022-06-26 DIAGNOSIS — I15.9 SECONDARY HYPERTENSION: ICD-10-CM

## 2022-06-26 PROCEDURE — 82530 CORTISOL FREE: CPT

## 2022-06-29 LAB
COLLECT DURATION TIME UR: 24 H
CORTIS 24H UR-MRATE: 29 MCG/24 H (ref 3.5–45)
CORTISONE 24H UR-MRATE: 86 MCG/24 H (ref 17–129)
SPECIMEN VOL 24H UR: 1250 ML

## 2022-07-01 NOTE — RESULT ENCOUNTER NOTE
Evaluation so far has been normal, including plasma metanephrines, urine cortisol, and renal doppler ultrasounds. We could do a sleep study but she denies symptoms of SONJA. I think this HTN could be from her OCPs and so I recommend she follow up with her OB. Follow up with Zonia as planned, thanks!

## 2022-08-29 DIAGNOSIS — I10 BENIGN ESSENTIAL HYPERTENSION: ICD-10-CM

## 2022-08-29 RX ORDER — LABETALOL 200 MG/1
200 TABLET, FILM COATED ORAL 2 TIMES DAILY
Qty: 180 TABLET | Refills: 0 | Status: SHIPPED | OUTPATIENT
Start: 2022-08-29 | End: 2022-11-09

## 2022-10-03 ENCOUNTER — HEALTH MAINTENANCE LETTER (OUTPATIENT)
Age: 22
End: 2022-10-03

## 2022-11-09 ENCOUNTER — OFFICE VISIT (OUTPATIENT)
Dept: CARDIOLOGY | Facility: CLINIC | Age: 22
End: 2022-11-09
Attending: INTERNAL MEDICINE
Payer: COMMERCIAL

## 2022-11-09 VITALS
WEIGHT: 258.2 LBS | SYSTOLIC BLOOD PRESSURE: 139 MMHG | BODY MASS INDEX: 40.53 KG/M2 | HEIGHT: 67 IN | DIASTOLIC BLOOD PRESSURE: 86 MMHG | HEART RATE: 64 BPM

## 2022-11-09 DIAGNOSIS — I10 BENIGN ESSENTIAL HYPERTENSION: ICD-10-CM

## 2022-11-09 DIAGNOSIS — E78.5 DYSLIPIDEMIA: Primary | ICD-10-CM

## 2022-11-09 PROCEDURE — 99213 OFFICE O/P EST LOW 20 MIN: CPT | Performed by: NURSE PRACTITIONER

## 2022-11-09 RX ORDER — LABETALOL 200 MG/1
200 TABLET, FILM COATED ORAL 2 TIMES DAILY
Qty: 180 TABLET | Refills: 3 | Status: SHIPPED | OUTPATIENT
Start: 2022-11-09 | End: 2023-11-21

## 2022-11-09 NOTE — LETTER
11/9/2022    Provider Not In System       RE: Laureen Kohler       Dear Colleague,     I had the pleasure of seeing Laureen Kohler in the [facility].    General Cardiology Clinic Progress Note  Laureen Kohler MRN# [id number]   YOB: 2000 Age: 21 year old     Primary cardiologist: Dr. Rodríguez    Reason for visit: Hypertension follow up    History of presenting illness:    Laureen Kohler, a pleasant 21 year old patient who has a past medical history significant for obesity, exercise-induced asthma, family history of early ASCVD (father passed away at the age of 50 from an MI), dyslipidemia diagnosed in childhood.    She establish care with Dr. Rodríguez in February 2022 given her strong family history of premature ASCVD.  At that time she was noted to be mildly tachycardic and reported history of longstanding hypertension.  She also reported that she was diagnosed with elevated cholesterol at the age of 8.     A 24-hour blood pressure monitor was obtained in February 2022 demonstrated an average blood pressure of 125/80 mmHg with an average heart rate of 100 bpm.  Also, Holter monitor noted an average heart rate of 106 bpm with possible 15-minute burden of atrial fibrillation, however the waveforms were not captured.  She was started on labetalol 200 mg twice a day and repeat Holter demonstrated normal sinus rhythm with average heart rate at 87 bpm with rare PACs and PVCs.  There is no atrial fibrillation noted.    Had a follow-up with Dr. Rodríguez in May 2022 he recommended a secondary hypertension work-up.  Although, he was suspicious that her elevated blood pressure was related to oral contraceptives.  She underwent a renal artery ultrasound plasma free metanephrines, 24-hour urine for free cortisol excretion and was recommended to follow-up with her OB/GYN to discuss alternative birth control options. Thankfully, the renal ultrasound, plasma free metanephrines and 24-hour urine were all  normal.    Today she returns for a 3-month follow-up.  Since her last visit in May she is lost approximately 10 pounds by altering her eating habits and starting to exercise.  She is tolerating labetalol well without any concerning side effects.  She does note that she will have an nosebleed approximately 1 time a week that is resolved quickly.  Of note she is on a baby aspirin daily.          Assessment and Plan:     ASSESSMENT:    1. Hypertension    Currently controlled, but blood pressure is borderline on labetalol 200 mg twice daily    Secondary hypertension work-up was negative     Concerning for oral contraceptives exacerbating hypertension.  She has an appointment set up with her OB/GYN in January which was the soonest available.    2. Strong family history of ASCVD    Father passed away of an MI at the age of 50    She reports her uncle also has a history of premature CAD    3. Dyslipidemia    Fasting lipid profile from February 2022 showed total cholesterol 265, HDL 35, LDL 61 and triglycerides 343    PLAN:     1. Recommend fish oil supplementation for dyslipidemia  2. Follow with OB/GYN as scheduled to discuss alternative methods of contraception  3. Continue labetalol 200 mg twice daily  4. Follow-up with Dr. Rodríguez in 1 year or sooner if needed       Orders this Visit:  Orders Placed This Encounter   Procedures     Follow-Up with Cardiology     Orders Placed This Encounter   Medications     labetalol (NORMODYNE) 200 MG tablet     Sig: Take 1 tablet (200 mg) by mouth 2 times daily Patient is due for a follow-up appointment     Dispense:  180 tablet     Refill:  3     Medications Discontinued During This Encounter   Medication Reason     labetalol (NORMODYNE) 200 MG tablet Reorder       Today's clinic visit entailed:  Review of the result(s) of each unique test - Renal artery ultrasound, Holter, 24 blood pressure  Prescription drug management  15 minutes spent on the date of the encounter doing chart review,  "history and exam, documentation and further activities per the note  Provider  Link to [facility] Help Grid     The level of medical decision making during this visit was of moderate complexity.           Review of Systems:     Review of Systems:  Skin:        Eyes:       ENT:  Negative    Respiratory:  Negative    Cardiovascular:  Negative;palpitations;chest pain;syncope or near-syncope;cyanosis;exercise intolerance;fatigue;lightheadedness;dizziness    Gastroenterology:      Genitourinary:       Musculoskeletal:       Neurologic:       Psychiatric:  Positive for    Heme/Lymph/Imm:       Endocrine:                 Physical Exam:     Vitals: /86   Pulse 64   Ht 1.702 m (5' 7\")   Wt 117.1 kg (258 lb 3.2 oz)   BMI 40.44 kg/m    Constitutional: Well nourished and in no apparent distress.  Eyes: Pupils equal, round. Sclerae anicteric.   HEENT: Normocephalic, atraumatic.   Neck: Supple.   Respiratory: Breathing non-labored. Lungs clear to auscultation bilaterally. No crackles, wheezes, rhonchi, or rales.  Cardiovascular:  Regular rate and rhythm, normal S1 and S2. No murmur, rub, or gallop.  Skin: Warm, dry. No rashes, cyanosis, or xanthelasma.  Extremities: No edema.  Neurologic: No gross motor deficits. Alert, awake, and oriented to person, place and time.  Psychiatric: Affect appropriate.             Medications:     Current Outpatient Medications   Medication Sig Dispense Refill     albuterol (PROAIR HFA/PROVENTIL HFA/VENTOLIN HFA) 108 (90 Base) MCG/ACT inhaler Inhale 2 puffs into the lungs every 6 hours as needed for shortness of breath / dyspnea or wheezing       Aspirin 81 MG CAPS Take by mouth daily       labetalol (NORMODYNE) 200 MG tablet Take 1 tablet (200 mg) by mouth 2 times daily Patient is due for a follow-up appointment 180 tablet 3     norgestimate-ethinyl estradiol (ORTHO-CYCLEN) 0.25-35 MG-MCG tablet Take 1 tablet by mouth daily         Family History   Problem Relation Age of Onset     Myocardial " Infarction Father      Myocardial Infarction Maternal Grandfather      Myocardial Infarction Paternal Grandmother      Myocardial Infarction Paternal Grandfather      Myocardial Infarction Paternal Uncle        Social History     Socioeconomic History     Marital status: Single     Spouse name: Not on file     Number of children: Not on file     Years of education: Not on file     Highest education level: Not on file   Occupational History     Not on file   Tobacco Use     Smoking status: Never     Smokeless tobacco: Never   Substance and Sexual Activity     Alcohol use: Yes     Comment: 2 drinks month     Drug use: Not on file     Sexual activity: Not on file   Other Topics Concern     Parent/sibling w/ CABG, MI or angioplasty before 65F 55M? Yes   Social History Narrative     Not on file     Social Determinants of Health     Financial Resource Strain: Not on file   Food Insecurity: Not on file   Transportation Needs: Not on file   Physical Activity: Not on file   Stress: Not on file   Social Connections: Not on file   Intimate Partner Violence: Not on file   Housing Stability: Not on file            Past Medical History:     Past Medical History:   Diagnosis Date     Anxiety 2/15/2022     Benign essential hypertension 2/15/2022     Dyslipidemia 2/15/2022     Exercise-induced asthma 2/15/2022     Family history of dyslipidemia 2/15/2022     Family history of ischemic heart disease 2/15/2022     Morbid obesity (H) 2/15/2022              Past Surgical History:   History reviewed. No pertinent surgical history.           Allergies:   Sulfa drugs       Data:   All laboratory data reviewed:    Recent Labs   Lab Test 02/15/22  0952   *  161*   HDL 35*   NHDL 230*   CHOL 265*   TRIG 343*   TSH 2.61       Lab Results   Component Value Date    WBC 9.5 02/15/2022    RBC 5.28 (H) 02/15/2022    HGB 15.4 02/15/2022    HCT 45.9 02/15/2022    MCV 87 02/15/2022    MCH 29.2 02/15/2022    MCHC 33.6 02/15/2022    RDW 11.7  02/15/2022     02/15/2022       Lab Results   Component Value Date     02/15/2022    POTASSIUM 4.0 02/15/2022    CHLORIDE 104 02/15/2022    CO2 22 02/15/2022    ANIONGAP 9 02/15/2022     (H) 02/15/2022    BUN 9 02/15/2022    CR 0.61 02/15/2022    GFRESTIMATED >90 02/15/2022    ELI 9.6 02/15/2022      Lab Results   Component Value Date    AST 56 (H) 02/15/2022     (H) 02/15/2022       Lab Results   Component Value Date    A1C 6.1 (H) 02/15/2022       No results found for: INR      ANEL MAY CNP  [facility]  Pager: [phone]  RN phone: [phone]      Thank you for allowing me to participate in the care of your patient.      Sincerely,     ANEL MAY CNP     [facility]  cc:   Prem Rodríguez MD  [address]  [place] [address]

## 2022-11-09 NOTE — PROGRESS NOTES
General Cardiology Clinic Progress Note  Laureen Kohler MRN# [id number]   YOB: 2000 Age: 21 year old     Primary cardiologist: Dr. Rodríguez    Reason for visit: Hypertension follow up    History of presenting illness:    aLureen Kohler, a pleasant 21 year old patient who has a past medical history significant for obesity, exercise-induced asthma, family history of early ASCVD (father passed away at the age of 50 from an MI), dyslipidemia diagnosed in childhood.    She establish care with Dr. Rodríguez in February 2022 given her strong family history of premature ASCVD.  At that time she was noted to be mildly tachycardic and reported history of longstanding hypertension.  She also reported that she was diagnosed with elevated cholesterol at the age of 8.     A 24-hour blood pressure monitor was obtained in February 2022 demonstrated an average blood pressure of 125/80 mmHg with an average heart rate of 100 bpm.  Also, Holter monitor noted an average heart rate of 106 bpm with possible 15-minute burden of atrial fibrillation, however the waveforms were not captured.  She was started on labetalol 200 mg twice a day and repeat Holter demonstrated normal sinus rhythm with average heart rate at 87 bpm with rare PACs and PVCs.  There is no atrial fibrillation noted.    Had a follow-up with Dr. Rodríguze in May 2022 he recommended a secondary hypertension work-up.  Although, he was suspicious that her elevated blood pressure was related to oral contraceptives.  She underwent a renal artery ultrasound plasma free metanephrines, 24-hour urine for free cortisol excretion and was recommended to follow-up with her OB/GYN to discuss alternative birth control options. Thankfully, the renal ultrasound, plasma free metanephrines and 24-hour urine were all normal.    Today she returns for a 3-month follow-up.  Since her last visit in May she is lost approximately 10 pounds by altering her eating habits and starting to exercise.   She is tolerating labetalol well without any concerning side effects.  She does note that she will have an nosebleed approximately 1 time a week that is resolved quickly.  Of note she is on a baby aspirin daily.          Assessment and Plan:     ASSESSMENT:    1. Hypertension    Currently controlled, but blood pressure is borderline on labetalol 200 mg twice daily    Secondary hypertension work-up was negative     Concerning for oral contraceptives exacerbating hypertension.  She has an appointment set up with her OB/GYN in January which was the soonest available.    2. Strong family history of ASCVD    Father passed away of an MI at the age of 50    She reports her uncle also has a history of premature CAD    3. Dyslipidemia    Fasting lipid profile from February 2022 showed total cholesterol 265, HDL 35, LDL 61 and triglycerides 343    PLAN:     1. Recommend fish oil supplementation for dyslipidemia  2. Follow with OB/GYN as scheduled to discuss alternative methods of contraception  3. Continue labetalol 200 mg twice daily  4. Follow-up with Dr. Rodríguez in 1 year or sooner if needed       Orders this Visit:  Orders Placed This Encounter   Procedures     Follow-Up with Cardiology     Orders Placed This Encounter   Medications     labetalol (NORMODYNE) 200 MG tablet     Sig: Take 1 tablet (200 mg) by mouth 2 times daily Patient is due for a follow-up appointment     Dispense:  180 tablet     Refill:  3     Medications Discontinued During This Encounter   Medication Reason     labetalol (NORMODYNE) 200 MG tablet Reorder       Today's clinic visit entailed:  Review of the result(s) of each unique test - Renal artery ultrasound, Holter, 24 blood pressure  Prescription drug management  15 minutes spent on the date of the encounter doing chart review, history and exam, documentation and further activities per the note  Provider  Link to MDM Help Grid     The level of medical decision making during this visit was of moderate  "complexity.           Review of Systems:     Review of Systems:  Skin:        Eyes:       ENT:  Negative    Respiratory:  Negative    Cardiovascular:  Negative;palpitations;chest pain;syncope or near-syncope;cyanosis;exercise intolerance;fatigue;lightheadedness;dizziness    Gastroenterology:      Genitourinary:       Musculoskeletal:       Neurologic:       Psychiatric:  Positive for    Heme/Lymph/Imm:       Endocrine:                 Physical Exam:     Vitals: /86   Pulse 64   Ht 1.702 m (5' 7\")   Wt 117.1 kg (258 lb 3.2 oz)   BMI 40.44 kg/m    Constitutional: Well nourished and in no apparent distress.  Eyes: Pupils equal, round. Sclerae anicteric.   HEENT: Normocephalic, atraumatic.   Neck: Supple.   Respiratory: Breathing non-labored. Lungs clear to auscultation bilaterally. No crackles, wheezes, rhonchi, or rales.  Cardiovascular:  Regular rate and rhythm, normal S1 and S2. No murmur, rub, or gallop.  Skin: Warm, dry. No rashes, cyanosis, or xanthelasma.  Extremities: No edema.  Neurologic: No gross motor deficits. Alert, awake, and oriented to person, place and time.  Psychiatric: Affect appropriate.             Medications:     Current Outpatient Medications   Medication Sig Dispense Refill     albuterol (PROAIR HFA/PROVENTIL HFA/VENTOLIN HFA) 108 (90 Base) MCG/ACT inhaler Inhale 2 puffs into the lungs every 6 hours as needed for shortness of breath / dyspnea or wheezing       Aspirin 81 MG CAPS Take by mouth daily       labetalol (NORMODYNE) 200 MG tablet Take 1 tablet (200 mg) by mouth 2 times daily Patient is due for a follow-up appointment 180 tablet 3     norgestimate-ethinyl estradiol (ORTHO-CYCLEN) 0.25-35 MG-MCG tablet Take 1 tablet by mouth daily         Family History   Problem Relation Age of Onset     Myocardial Infarction Father      Myocardial Infarction Maternal Grandfather      Myocardial Infarction Paternal Grandmother      Myocardial Infarction Paternal Grandfather      Myocardial " Infarction Paternal Uncle        Social History     Socioeconomic History     Marital status: Single     Spouse name: Not on file     Number of children: Not on file     Years of education: Not on file     Highest education level: Not on file   Occupational History     Not on file   Tobacco Use     Smoking status: Never     Smokeless tobacco: Never   Substance and Sexual Activity     Alcohol use: Yes     Comment: 2 drinks month     Drug use: Not on file     Sexual activity: Not on file   Other Topics Concern     Parent/sibling w/ CABG, MI or angioplasty before 65F 55M? Yes   Social History Narrative     Not on file     Social Determinants of Health     Financial Resource Strain: Not on file   Food Insecurity: Not on file   Transportation Needs: Not on file   Physical Activity: Not on file   Stress: Not on file   Social Connections: Not on file   Intimate Partner Violence: Not on file   Housing Stability: Not on file            Past Medical History:     Past Medical History:   Diagnosis Date     Anxiety 2/15/2022     Benign essential hypertension 2/15/2022     Dyslipidemia 2/15/2022     Exercise-induced asthma 2/15/2022     Family history of dyslipidemia 2/15/2022     Family history of ischemic heart disease 2/15/2022     Morbid obesity (H) 2/15/2022              Past Surgical History:   History reviewed. No pertinent surgical history.           Allergies:   Sulfa drugs       Data:   All laboratory data reviewed:    Recent Labs   Lab Test 02/15/22  0952   *  161*   HDL 35*   NHDL 230*   CHOL 265*   TRIG 343*   TSH 2.61       Lab Results   Component Value Date    WBC 9.5 02/15/2022    RBC 5.28 (H) 02/15/2022    HGB 15.4 02/15/2022    HCT 45.9 02/15/2022    MCV 87 02/15/2022    MCH 29.2 02/15/2022    MCHC 33.6 02/15/2022    RDW 11.7 02/15/2022     02/15/2022       Lab Results   Component Value Date     02/15/2022    POTASSIUM 4.0 02/15/2022    CHLORIDE 104 02/15/2022    CO2 22 02/15/2022     ANIONGAP 9 02/15/2022     (H) 02/15/2022    BUN 9 02/15/2022    CR 0.61 02/15/2022    GFRESTIMATED >90 02/15/2022    ELI 9.6 02/15/2022      Lab Results   Component Value Date    AST 56 (H) 02/15/2022     (H) 02/15/2022       Lab Results   Component Value Date    A1C 6.1 (H) 02/15/2022       No results found for: INR      ANEL MAY [facility]  Pager: [phone]  RN phone: [phone]

## 2022-11-09 NOTE — PATIENT INSTRUCTIONS
Today's Recommendations    Consider over the counter fish oil  Follow with OB/GYN as scheduled. Could consider OGI.  Continue all other medications without changes.  Please follow up with Dr. Rodríguez in 1 year.    Please send a [employer] message or call [phone] to the RN team with questions or concerns.     Scheduling number [phone]    ANEL Cook, CNP

## 2023-05-20 ENCOUNTER — HEALTH MAINTENANCE LETTER (OUTPATIENT)
Age: 23
End: 2023-05-20

## 2023-11-21 ENCOUNTER — TELEPHONE (OUTPATIENT)
Dept: CARDIOLOGY | Facility: CLINIC | Age: 23
End: 2023-11-21
Payer: COMMERCIAL

## 2023-11-21 DIAGNOSIS — I10 BENIGN ESSENTIAL HYPERTENSION: ICD-10-CM

## 2023-11-21 RX ORDER — LABETALOL 200 MG/1
200 TABLET, FILM COATED ORAL 2 TIMES DAILY
Qty: 180 TABLET | Refills: 0 | Status: SHIPPED | OUTPATIENT
Start: 2023-11-21

## 2024-07-27 ENCOUNTER — HEALTH MAINTENANCE LETTER (OUTPATIENT)
Age: 24
End: 2024-07-27

## 2025-08-10 ENCOUNTER — HEALTH MAINTENANCE LETTER (OUTPATIENT)
Age: 25
End: 2025-08-10